# Patient Record
Sex: MALE | Race: WHITE | NOT HISPANIC OR LATINO | URBAN - METROPOLITAN AREA
[De-identification: names, ages, dates, MRNs, and addresses within clinical notes are randomized per-mention and may not be internally consistent; named-entity substitution may affect disease eponyms.]

---

## 2017-06-28 ENCOUNTER — OUTPATIENT (OUTPATIENT)
Dept: OUTPATIENT SERVICES | Facility: HOSPITAL | Age: 62
LOS: 1 days | End: 2017-06-28
Payer: COMMERCIAL

## 2017-06-28 ENCOUNTER — APPOINTMENT (OUTPATIENT)
Dept: MRI IMAGING | Facility: IMAGING CENTER | Age: 62
End: 2017-06-28

## 2017-06-28 DIAGNOSIS — Z00.8 ENCOUNTER FOR OTHER GENERAL EXAMINATION: ICD-10-CM

## 2017-06-28 PROCEDURE — 72148 MRI LUMBAR SPINE W/O DYE: CPT

## 2017-06-28 PROCEDURE — 70547 MR ANGIOGRAPHY NECK W/O DYE: CPT

## 2017-06-28 PROCEDURE — 70551 MRI BRAIN STEM W/O DYE: CPT

## 2017-06-28 PROCEDURE — 70544 MR ANGIOGRAPHY HEAD W/O DYE: CPT

## 2017-07-03 ENCOUNTER — APPOINTMENT (OUTPATIENT)
Dept: VASCULAR SURGERY | Facility: CLINIC | Age: 62
End: 2017-07-03

## 2017-07-03 VITALS
TEMPERATURE: 98 F | HEART RATE: 87 BPM | WEIGHT: 162 LBS | DIASTOLIC BLOOD PRESSURE: 86 MMHG | BODY MASS INDEX: 26.03 KG/M2 | HEIGHT: 66 IN | SYSTOLIC BLOOD PRESSURE: 130 MMHG

## 2017-07-03 DIAGNOSIS — Z86.73 PERSONAL HISTORY OF TRANSIENT ISCHEMIC ATTACK (TIA), AND CEREBRAL INFARCTION W/OUT RESIDUAL DEFICITS: ICD-10-CM

## 2017-07-05 ENCOUNTER — TRANSCRIPTION ENCOUNTER (OUTPATIENT)
Age: 62
End: 2017-07-05

## 2017-07-18 ENCOUNTER — FORM ENCOUNTER (OUTPATIENT)
Age: 62
End: 2017-07-18

## 2017-07-19 ENCOUNTER — OUTPATIENT (OUTPATIENT)
Dept: OUTPATIENT SERVICES | Facility: HOSPITAL | Age: 62
LOS: 1 days | End: 2017-07-19
Payer: COMMERCIAL

## 2017-07-19 ENCOUNTER — APPOINTMENT (OUTPATIENT)
Dept: MRI IMAGING | Facility: CLINIC | Age: 62
End: 2017-07-19

## 2017-07-19 DIAGNOSIS — Z00.8 ENCOUNTER FOR OTHER GENERAL EXAMINATION: ICD-10-CM

## 2017-07-19 PROCEDURE — C8911: CPT

## 2017-07-19 PROCEDURE — A9585: CPT

## 2017-07-26 ENCOUNTER — APPOINTMENT (OUTPATIENT)
Dept: MRI IMAGING | Facility: IMAGING CENTER | Age: 62
End: 2017-07-26

## 2017-07-26 ENCOUNTER — OUTPATIENT (OUTPATIENT)
Dept: OUTPATIENT SERVICES | Facility: HOSPITAL | Age: 62
LOS: 1 days | End: 2017-07-26
Payer: COMMERCIAL

## 2017-07-26 DIAGNOSIS — Z00.8 ENCOUNTER FOR OTHER GENERAL EXAMINATION: ICD-10-CM

## 2017-07-26 PROCEDURE — 72141 MRI NECK SPINE W/O DYE: CPT

## 2017-07-26 PROCEDURE — 72146 MRI CHEST SPINE W/O DYE: CPT

## 2017-07-31 ENCOUNTER — OUTPATIENT (OUTPATIENT)
Dept: OUTPATIENT SERVICES | Facility: HOSPITAL | Age: 62
LOS: 1 days | End: 2017-07-31
Payer: COMMERCIAL

## 2017-07-31 ENCOUNTER — APPOINTMENT (OUTPATIENT)
Dept: CT IMAGING | Facility: CLINIC | Age: 62
End: 2017-07-31
Payer: COMMERCIAL

## 2017-07-31 DIAGNOSIS — Z00.8 ENCOUNTER FOR OTHER GENERAL EXAMINATION: ICD-10-CM

## 2017-07-31 PROCEDURE — 72131 CT LUMBAR SPINE W/O DYE: CPT

## 2017-07-31 PROCEDURE — 72131 CT LUMBAR SPINE W/O DYE: CPT | Mod: 26

## 2017-08-31 ENCOUNTER — OUTPATIENT (OUTPATIENT)
Dept: OUTPATIENT SERVICES | Facility: HOSPITAL | Age: 62
LOS: 1 days | End: 2017-08-31
Payer: COMMERCIAL

## 2017-08-31 ENCOUNTER — APPOINTMENT (OUTPATIENT)
Dept: MRI IMAGING | Facility: IMAGING CENTER | Age: 62
End: 2017-08-31
Payer: COMMERCIAL

## 2017-08-31 DIAGNOSIS — Z00.8 ENCOUNTER FOR OTHER GENERAL EXAMINATION: ICD-10-CM

## 2017-08-31 PROCEDURE — 72195 MRI PELVIS W/O DYE: CPT | Mod: 26

## 2017-08-31 PROCEDURE — 73721 MRI JNT OF LWR EXTRE W/O DYE: CPT | Mod: 26,RT

## 2017-08-31 PROCEDURE — 72195 MRI PELVIS W/O DYE: CPT

## 2017-08-31 PROCEDURE — 73721 MRI JNT OF LWR EXTRE W/O DYE: CPT

## 2017-09-01 ENCOUNTER — APPOINTMENT (OUTPATIENT)
Dept: NUCLEAR MEDICINE | Facility: IMAGING CENTER | Age: 62
End: 2017-09-01

## 2017-09-08 ENCOUNTER — APPOINTMENT (OUTPATIENT)
Dept: NUCLEAR MEDICINE | Facility: IMAGING CENTER | Age: 62
End: 2017-09-08
Payer: COMMERCIAL

## 2017-09-08 ENCOUNTER — OUTPATIENT (OUTPATIENT)
Dept: OUTPATIENT SERVICES | Facility: HOSPITAL | Age: 62
LOS: 1 days | End: 2017-09-08
Payer: COMMERCIAL

## 2017-09-08 DIAGNOSIS — Z00.8 ENCOUNTER FOR OTHER GENERAL EXAMINATION: ICD-10-CM

## 2017-09-08 PROCEDURE — 78315 BONE IMAGING 3 PHASE: CPT

## 2017-09-08 PROCEDURE — 78320: CPT | Mod: 26

## 2017-09-08 PROCEDURE — 78999 UNLISTED MISC PX DX NUC MED: CPT

## 2017-09-08 PROCEDURE — A9561: CPT

## 2018-05-08 ENCOUNTER — OUTPATIENT (OUTPATIENT)
Dept: OUTPATIENT SERVICES | Facility: HOSPITAL | Age: 63
LOS: 1 days | End: 2018-05-08
Payer: COMMERCIAL

## 2018-05-08 ENCOUNTER — APPOINTMENT (OUTPATIENT)
Dept: MRI IMAGING | Facility: IMAGING CENTER | Age: 63
End: 2018-05-08
Payer: COMMERCIAL

## 2018-05-08 DIAGNOSIS — Z00.8 ENCOUNTER FOR OTHER GENERAL EXAMINATION: ICD-10-CM

## 2018-05-08 PROCEDURE — 70551 MRI BRAIN STEM W/O DYE: CPT | Mod: 26

## 2018-05-08 PROCEDURE — 70551 MRI BRAIN STEM W/O DYE: CPT

## 2019-05-13 ENCOUNTER — OUTPATIENT (OUTPATIENT)
Dept: OUTPATIENT SERVICES | Facility: HOSPITAL | Age: 64
LOS: 1 days | End: 2019-05-13
Payer: MEDICARE

## 2019-05-13 ENCOUNTER — APPOINTMENT (OUTPATIENT)
Dept: MRI IMAGING | Facility: IMAGING CENTER | Age: 64
End: 2019-05-13
Payer: MEDICARE

## 2019-05-13 DIAGNOSIS — Z00.8 ENCOUNTER FOR OTHER GENERAL EXAMINATION: ICD-10-CM

## 2019-05-13 PROCEDURE — 70551 MRI BRAIN STEM W/O DYE: CPT | Mod: 26

## 2019-05-13 PROCEDURE — 70551 MRI BRAIN STEM W/O DYE: CPT

## 2020-04-29 ENCOUNTER — OUTPATIENT (OUTPATIENT)
Dept: OUTPATIENT SERVICES | Facility: HOSPITAL | Age: 65
LOS: 1 days | End: 2020-04-29
Payer: MEDICARE

## 2020-04-29 ENCOUNTER — APPOINTMENT (OUTPATIENT)
Dept: MRI IMAGING | Facility: IMAGING CENTER | Age: 65
End: 2020-04-29
Payer: MEDICARE

## 2020-04-29 DIAGNOSIS — Z00.8 ENCOUNTER FOR OTHER GENERAL EXAMINATION: ICD-10-CM

## 2020-04-29 PROCEDURE — 70551 MRI BRAIN STEM W/O DYE: CPT | Mod: 26

## 2020-04-29 PROCEDURE — 70551 MRI BRAIN STEM W/O DYE: CPT

## 2020-08-06 ENCOUNTER — OUTPATIENT (OUTPATIENT)
Dept: OUTPATIENT SERVICES | Facility: HOSPITAL | Age: 65
LOS: 1 days | End: 2020-08-06
Payer: MEDICARE

## 2020-08-06 ENCOUNTER — APPOINTMENT (OUTPATIENT)
Dept: MRI IMAGING | Facility: CLINIC | Age: 65
End: 2020-08-06

## 2020-08-06 DIAGNOSIS — Z00.8 ENCOUNTER FOR OTHER GENERAL EXAMINATION: ICD-10-CM

## 2020-08-06 PROCEDURE — 70551 MRI BRAIN STEM W/O DYE: CPT | Mod: 26

## 2020-08-06 PROCEDURE — 70551 MRI BRAIN STEM W/O DYE: CPT

## 2020-11-20 ENCOUNTER — EMERGENCY (EMERGENCY)
Facility: HOSPITAL | Age: 65
LOS: 1 days | Discharge: HOME | End: 2020-11-20
Attending: EMERGENCY MEDICINE
Payer: MEDICARE

## 2020-11-20 VITALS
RESPIRATION RATE: 17 BRPM | SYSTOLIC BLOOD PRESSURE: 133 MMHG | TEMPERATURE: 98 F | HEART RATE: 72 BPM | DIASTOLIC BLOOD PRESSURE: 75 MMHG | OXYGEN SATURATION: 100 %

## 2020-11-20 VITALS
TEMPERATURE: 98 F | WEIGHT: 158.95 LBS | RESPIRATION RATE: 18 BRPM | OXYGEN SATURATION: 100 % | DIASTOLIC BLOOD PRESSURE: 88 MMHG | HEART RATE: 81 BPM | HEIGHT: 66 IN | SYSTOLIC BLOOD PRESSURE: 148 MMHG

## 2020-11-20 LAB
ALBUMIN SERPL ELPH-MCNC: 4.7 G/DL — SIGNIFICANT CHANGE UP (ref 3.3–5)
ALP SERPL-CCNC: 49 U/L — SIGNIFICANT CHANGE UP (ref 40–120)
ALT FLD-CCNC: 22 U/L — SIGNIFICANT CHANGE UP (ref 10–45)
ANION GAP SERPL CALC-SCNC: 13 MMOL/L — SIGNIFICANT CHANGE UP (ref 5–17)
APTT BLD: 57.7 SEC — HIGH (ref 27.5–35.5)
AST SERPL-CCNC: 28 U/L — SIGNIFICANT CHANGE UP (ref 10–40)
BASOPHILS # BLD AUTO: 0.08 K/UL — SIGNIFICANT CHANGE UP (ref 0–0.2)
BASOPHILS NFR BLD AUTO: 1 % — SIGNIFICANT CHANGE UP (ref 0–2)
BILIRUB SERPL-MCNC: 0.5 MG/DL — SIGNIFICANT CHANGE UP (ref 0.2–1.2)
BUN SERPL-MCNC: 13 MG/DL — SIGNIFICANT CHANGE UP (ref 7–23)
CALCIUM SERPL-MCNC: 9.8 MG/DL — SIGNIFICANT CHANGE UP (ref 8.4–10.5)
CHLORIDE SERPL-SCNC: 102 MMOL/L — SIGNIFICANT CHANGE UP (ref 96–108)
CO2 SERPL-SCNC: 24 MMOL/L — SIGNIFICANT CHANGE UP (ref 22–31)
CREAT SERPL-MCNC: 1.29 MG/DL — SIGNIFICANT CHANGE UP (ref 0.5–1.3)
EOSINOPHIL # BLD AUTO: 0.21 K/UL — SIGNIFICANT CHANGE UP (ref 0–0.5)
EOSINOPHIL NFR BLD AUTO: 2.6 % — SIGNIFICANT CHANGE UP (ref 0–6)
GLUCOSE SERPL-MCNC: 92 MG/DL — SIGNIFICANT CHANGE UP (ref 70–99)
HCT VFR BLD CALC: 43 % — SIGNIFICANT CHANGE UP (ref 39–50)
HGB BLD-MCNC: 14.6 G/DL — SIGNIFICANT CHANGE UP (ref 13–17)
IMM GRANULOCYTES NFR BLD AUTO: 0.2 % — SIGNIFICANT CHANGE UP (ref 0–1.5)
INR BLD: 3.59 RATIO — HIGH (ref 0.88–1.16)
LYMPHOCYTES # BLD AUTO: 2.5 K/UL — SIGNIFICANT CHANGE UP (ref 1–3.3)
LYMPHOCYTES # BLD AUTO: 31 % — SIGNIFICANT CHANGE UP (ref 13–44)
MCHC RBC-ENTMCNC: 31.3 PG — SIGNIFICANT CHANGE UP (ref 27–34)
MCHC RBC-ENTMCNC: 34 GM/DL — SIGNIFICANT CHANGE UP (ref 32–36)
MCV RBC AUTO: 92.3 FL — SIGNIFICANT CHANGE UP (ref 80–100)
MONOCYTES # BLD AUTO: 0.66 K/UL — SIGNIFICANT CHANGE UP (ref 0–0.9)
MONOCYTES NFR BLD AUTO: 8.2 % — SIGNIFICANT CHANGE UP (ref 2–14)
NEUTROPHILS # BLD AUTO: 4.59 K/UL — SIGNIFICANT CHANGE UP (ref 1.8–7.4)
NEUTROPHILS NFR BLD AUTO: 57 % — SIGNIFICANT CHANGE UP (ref 43–77)
NRBC # BLD: 0 /100 WBCS — SIGNIFICANT CHANGE UP (ref 0–0)
OB PNL STL: NEGATIVE — SIGNIFICANT CHANGE UP
PLATELET # BLD AUTO: 310 K/UL — SIGNIFICANT CHANGE UP (ref 150–400)
POTASSIUM SERPL-MCNC: 4.3 MMOL/L — SIGNIFICANT CHANGE UP (ref 3.5–5.3)
POTASSIUM SERPL-SCNC: 4.3 MMOL/L — SIGNIFICANT CHANGE UP (ref 3.5–5.3)
PROT SERPL-MCNC: 7.4 G/DL — SIGNIFICANT CHANGE UP (ref 6–8.3)
PROTHROM AB SERPL-ACNC: 40.5 SEC — HIGH (ref 10.6–13.6)
RBC # BLD: 4.66 M/UL — SIGNIFICANT CHANGE UP (ref 4.2–5.8)
RBC # FLD: 13 % — SIGNIFICANT CHANGE UP (ref 10.3–14.5)
SODIUM SERPL-SCNC: 139 MMOL/L — SIGNIFICANT CHANGE UP (ref 135–145)
WBC # BLD: 8.06 K/UL — SIGNIFICANT CHANGE UP (ref 3.8–10.5)
WBC # FLD AUTO: 8.06 K/UL — SIGNIFICANT CHANGE UP (ref 3.8–10.5)

## 2020-11-20 PROCEDURE — 99283 EMERGENCY DEPT VISIT LOW MDM: CPT

## 2020-11-20 PROCEDURE — 85610 PROTHROMBIN TIME: CPT

## 2020-11-20 PROCEDURE — 80053 COMPREHEN METABOLIC PANEL: CPT

## 2020-11-20 PROCEDURE — 85730 THROMBOPLASTIN TIME PARTIAL: CPT

## 2020-11-20 PROCEDURE — 99284 EMERGENCY DEPT VISIT MOD MDM: CPT | Mod: GC

## 2020-11-20 PROCEDURE — 85025 COMPLETE CBC W/AUTO DIFF WBC: CPT

## 2020-11-20 PROCEDURE — 82272 OCCULT BLD FECES 1-3 TESTS: CPT

## 2020-11-20 NOTE — ED ADULT NURSE NOTE - OBJECTIVE STATEMENT
64 y/o male coming to the Er with c/o black stools x2 days. PMH multiple TIA, DM2, HTN, hiatal hernia, antiphonolipid syndrome, asthma, osteoporosis, angioedema (april 2020). Patient reports having multiple black stools the past 2 days. 64 y/o male coming to the Er with c/o black stools x2 days. PMH multiple TIA, DM2, HTN, hiatal hernia, antiphonolipid syndrome, asthma, osteoporosis, angioedema (april 2020). Patient reports having multiple formed black stools the past 2 days. Lung sounds clear b/l. capillary refill less than 2 seconds. Abdomen is soft, non distended, non tender. Patient denies chest pain, SOB, fever, chills, n/v/d, abdominal pain, urinary symptoms, blood in the urine, denies taking Pepto-Bismol. NINO JORGENSEN at the bedside. Safety measures maintained. Will continue to monitor. 64 y/o male coming to the Er with c/o black stools x2 days. PMH multiple TIA, DM2, HTN, hiatal hernia, antiphonolipid syndrome, asthma, osteoporosis, angioedema (april 2020). Patient reports having multiple formed black stools the past 2 days. Patient reports taking 2mg of Coumadin daily. Lung sounds clear b/l. capillary refill less than 2 seconds. Abdomen is soft, non distended, non tender. Patient denies chest pain, SOB, fever, chills, n/v/d, abdominal pain, urinary symptoms, blood in the urine, denies taking Pepto-Bismol. DERRICK. MD at the bedside. Safety measures maintained. Will continue to monitor. 66 y/o male coming to the Er with c/o black stools x2 days. PMH multiple TIA, DM2, hemorrhoids, HTN, hiatal hernia, antiphonolipid syndrome, asthma, osteoporosis, angioedema (april 2020). Patient reports having multiple formed black stools the past 2 days. Patient reports taking 2mg of Coumadin daily. Lung sounds clear b/l. capillary refill less than 2 seconds. Abdomen is soft, non distended, non tender. Patient denies chest pain, SOB, fever, chills, n/v/d, abdominal pain, urinary symptoms, blood in the urine, denies taking Pepto-Bismol. NINO JORGENSEN at the bedside. Safety measures maintained. Will continue to monitor.

## 2020-11-20 NOTE — ED PROVIDER NOTE - PMH
Antiphospholipid Syndrome    Asthma    Cataract    Diabetes Mellitus Type I    Dysphagia    Hiatal Hernia    History of Transient Ischemic Attack    HLD (Hyperlipidemia)    Hyperlipidemia    Hypothyroidism    Raynaud Disease    TIA (Transient Ischemic Attack)    Vitamin D Deficiency

## 2020-11-20 NOTE — ED PROVIDER NOTE - OBJECTIVE STATEMENT
attn - pt seen in OR2 - pt reports black stool x 2 days without pain or other symptoms.  NO: dyspepsia, abdo pain, lightheadedness, cp, sob, guillory, other bleeding/bruising.  Pt had colonoscopy in Aug for polyps every 3 yrs.  pt took Pepcid until Sept.  pt has hx of antiphospholipid with TIA and takes warfarin daily -  no change in dose the last 4 yrs.  Pt emailed his GI Dr. Amador Metz and advised to go to ER.  PMD - Lj Christensen. attn - pt seen in OR2 - pt reports black stool x 2 days without pain or other symptoms.  NO: dyspepsia, abdo pain, lightheadedness, cp, sob, guillory, other bleeding/bruising.  Pt had colonoscopy in Aug for polyps every 3 yrs.  pt took Pepcid until Sept.  pt has hx of antiphospholipid with TIA and takes warfarin daily -  no change in dose the last 4 yrs.  Pt emailed his GI Dr. Amador Metz and advised to go to ER.  PMD - Lj Christensen.    Neha PGY2 - pt w/ hx colorblindness (reported nurse's stool same color as nurse's hair which was brown), hx TIA on coumadin, hx antiphospholipid antibody, recent labwork in october indicating H/H 14/40, reports asymptomatic dark stools over several days. Colonoscopy in august unremarkable. No weakness CP, HA, SOB, hematochezia, f/c/n/v/d.

## 2020-11-20 NOTE — ED PROVIDER NOTE - PROGRESS NOTE DETAILS
jeremi: occult neg, H/H comparable to prior month w/ reported dark stools x48 hours. Will f/u remaining labs and likely d/c with return precautions, pt to f/u with GI. jeremi: inr slightly elevated to 3.6, requsted to hold for one day, otherwise BMP nl. will dc. precautions given. Resident: Ivan Solomon – Pt was re-evaluated at bedside, VSS, feeling better overall. Results were discussed with patient as well as return precautions and follow up plan with PCP and/or specialist. Time was taken to answer any questions that the patient had before providing them with discharge paperwork.

## 2020-11-20 NOTE — ED PROVIDER NOTE - PATIENT PORTAL LINK FT
You can access the FollowMyHealth Patient Portal offered by [facility] by registering at the following website: [email or web address]. By joining [employer]’s FollowMyHealth portal, you will also be able to view your health information using other applications (apps) compatible with our system.

## 2020-11-20 NOTE — ED PROVIDER NOTE - NSFOLLOWUPINSTRUCTIONS_ED_ALL_ED_FT
Please follow up with your primary care provider for further concerns you may have regarding your general health. Attached you will find your results from today's visit. Continue taking your medications as prescribed and keep your upcoming medical appointments.    Please return if you develop grossly bloody stools or if you began developing weakness, shortness of breath, chest pain, or began vomiting or coughing blood. Follow up with your gastrointestinal doctor as they requested.     Your results from today's visit are requested.     Continue taking your medications as indicated. Hold your coumadin 1 day as we discussed as your INR was slightly high.

## 2020-11-20 NOTE — ED PROVIDER NOTE - PHYSICAL EXAMINATION
Attn - alert, nad, no pallor, moist mm, lungs-, cor-, abdo - soft, NT, ND, no CVAT, rectal per resident - dark brown stool - guiac sent to lab., extrem-, neuro-, skin - no petechia or ecchymosis.

## 2021-04-28 ENCOUNTER — APPOINTMENT (OUTPATIENT)
Dept: MRI IMAGING | Facility: CLINIC | Age: 66
End: 2021-04-28
Payer: MEDICARE

## 2021-04-28 ENCOUNTER — OUTPATIENT (OUTPATIENT)
Dept: OUTPATIENT SERVICES | Facility: HOSPITAL | Age: 66
LOS: 1 days | End: 2021-04-28
Payer: MEDICARE

## 2021-04-28 DIAGNOSIS — I67.89 OTHER CEREBROVASCULAR DISEASE: ICD-10-CM

## 2021-04-28 PROCEDURE — 70551 MRI BRAIN STEM W/O DYE: CPT | Mod: 26,MH

## 2021-04-28 PROCEDURE — 70551 MRI BRAIN STEM W/O DYE: CPT

## 2021-09-22 NOTE — ED ADULT NURSE NOTE - MODE OF DISCHARGE
General--no acute distress Eyes--anicteric HENT--normocephalic, atraumatic head Neck--no lymphadenopathy Chest--normal breath sounds Heart--regular rate and rhythm Abdomen--soft, non tender, non distended, bowel sounds present Musculoskeletal--normal gait and station Skin--no rashes Neurologic--Alert and oriented x 3 Psychiatric--stable mood, appropriate affect
Ambulatory

## 2022-04-12 ENCOUNTER — APPOINTMENT (OUTPATIENT)
Dept: MRI IMAGING | Facility: CLINIC | Age: 67
End: 2022-04-12
Payer: MEDICARE

## 2022-04-12 ENCOUNTER — OUTPATIENT (OUTPATIENT)
Dept: OUTPATIENT SERVICES | Facility: HOSPITAL | Age: 67
LOS: 1 days | End: 2022-04-12
Payer: MEDICARE

## 2022-04-12 DIAGNOSIS — Z00.8 ENCOUNTER FOR OTHER GENERAL EXAMINATION: ICD-10-CM

## 2022-04-12 PROCEDURE — 70551 MRI BRAIN STEM W/O DYE: CPT | Mod: 26,MH

## 2022-04-12 PROCEDURE — 70551 MRI BRAIN STEM W/O DYE: CPT | Mod: MH

## 2022-09-19 ENCOUNTER — OUTPATIENT (OUTPATIENT)
Dept: OUTPATIENT SERVICES | Facility: HOSPITAL | Age: 67
LOS: 1 days | End: 2022-09-19
Payer: MEDICARE

## 2022-09-19 ENCOUNTER — APPOINTMENT (OUTPATIENT)
Dept: MRI IMAGING | Facility: CLINIC | Age: 67
End: 2022-09-19

## 2022-09-19 DIAGNOSIS — Z00.00 ENCOUNTER FOR GENERAL ADULT MEDICAL EXAMINATION WITHOUT ABNORMAL FINDINGS: ICD-10-CM

## 2022-09-19 DIAGNOSIS — R42 DIZZINESS AND GIDDINESS: ICD-10-CM

## 2022-09-19 DIAGNOSIS — R20.0 ANESTHESIA OF SKIN: ICD-10-CM

## 2022-09-19 PROCEDURE — 72141 MRI NECK SPINE W/O DYE: CPT | Mod: MH

## 2022-09-19 PROCEDURE — 72141 MRI NECK SPINE W/O DYE: CPT | Mod: 26,MH

## 2022-09-19 PROCEDURE — 70553 MRI BRAIN STEM W/O & W/DYE: CPT | Mod: MH

## 2022-09-19 PROCEDURE — 70553 MRI BRAIN STEM W/O & W/DYE: CPT | Mod: 26,MH

## 2022-09-19 PROCEDURE — 70544 MR ANGIOGRAPHY HEAD W/O DYE: CPT | Mod: MH

## 2022-09-19 PROCEDURE — 70544 MR ANGIOGRAPHY HEAD W/O DYE: CPT | Mod: 26,MH,59

## 2022-09-19 PROCEDURE — A9585: CPT

## 2022-09-29 ENCOUNTER — APPOINTMENT (OUTPATIENT)
Dept: MRI IMAGING | Facility: CLINIC | Age: 67
End: 2022-09-29

## 2022-09-29 PROCEDURE — 70547 MR ANGIOGRAPHY NECK W/O DYE: CPT

## 2022-09-29 PROCEDURE — 73218 MRI UPPER EXTREMITY W/O DYE: CPT | Mod: LT,MH

## 2022-09-29 PROCEDURE — 73221 MRI JOINT UPR EXTREM W/O DYE: CPT | Mod: LT,MH

## 2022-09-29 PROCEDURE — 70544 MR ANGIOGRAPHY HEAD W/O DYE: CPT | Mod: MH

## 2023-04-27 ENCOUNTER — OUTPATIENT (OUTPATIENT)
Dept: OUTPATIENT SERVICES | Facility: HOSPITAL | Age: 68
LOS: 1 days | End: 2023-04-27
Payer: MEDICARE

## 2023-04-27 ENCOUNTER — APPOINTMENT (OUTPATIENT)
Dept: MRI IMAGING | Facility: CLINIC | Age: 68
End: 2023-04-27
Payer: MEDICARE

## 2023-04-27 DIAGNOSIS — G45.9 TRANSIENT CEREBRAL ISCHEMIC ATTACK, UNSPECIFIED: ICD-10-CM

## 2023-04-27 PROCEDURE — 70551 MRI BRAIN STEM W/O DYE: CPT | Mod: 26,MH

## 2023-04-27 PROCEDURE — 70551 MRI BRAIN STEM W/O DYE: CPT | Mod: MH

## 2024-04-22 ENCOUNTER — OUTPATIENT (OUTPATIENT)
Dept: OUTPATIENT SERVICES | Facility: HOSPITAL | Age: 69
LOS: 1 days | End: 2024-04-22
Payer: MEDICARE

## 2024-04-22 ENCOUNTER — APPOINTMENT (OUTPATIENT)
Dept: MRI IMAGING | Facility: CLINIC | Age: 69
End: 2024-04-22
Payer: MEDICARE

## 2024-04-22 DIAGNOSIS — Z00.8 ENCOUNTER FOR OTHER GENERAL EXAMINATION: ICD-10-CM

## 2024-04-22 PROCEDURE — 70551 MRI BRAIN STEM W/O DYE: CPT | Mod: 26,MH

## 2024-04-22 PROCEDURE — 70551 MRI BRAIN STEM W/O DYE: CPT | Mod: MH

## 2024-05-23 ENCOUNTER — APPOINTMENT (OUTPATIENT)
Dept: MRI IMAGING | Facility: CLINIC | Age: 69
End: 2024-05-23
Payer: MEDICARE

## 2024-05-23 ENCOUNTER — OUTPATIENT (OUTPATIENT)
Dept: OUTPATIENT SERVICES | Facility: HOSPITAL | Age: 69
LOS: 1 days | End: 2024-05-23
Payer: MEDICARE

## 2024-05-23 DIAGNOSIS — Z00.8 ENCOUNTER FOR OTHER GENERAL EXAMINATION: ICD-10-CM

## 2024-05-23 PROCEDURE — 72148 MRI LUMBAR SPINE W/O DYE: CPT | Mod: 26,MH

## 2024-05-23 PROCEDURE — 72148 MRI LUMBAR SPINE W/O DYE: CPT | Mod: MH

## 2024-07-02 ENCOUNTER — OUTPATIENT (OUTPATIENT)
Dept: OUTPATIENT SERVICES | Facility: HOSPITAL | Age: 69
LOS: 1 days | End: 2024-07-02
Payer: MEDICARE

## 2024-07-02 ENCOUNTER — APPOINTMENT (OUTPATIENT)
Dept: MRI IMAGING | Facility: CLINIC | Age: 69
End: 2024-07-02
Payer: MEDICARE

## 2024-07-02 DIAGNOSIS — Z00.8 ENCOUNTER FOR OTHER GENERAL EXAMINATION: ICD-10-CM

## 2024-07-02 PROCEDURE — 70551 MRI BRAIN STEM W/O DYE: CPT | Mod: MH

## 2024-07-02 PROCEDURE — 70551 MRI BRAIN STEM W/O DYE: CPT | Mod: 26,MH

## 2024-09-16 ENCOUNTER — DOCTOR'S OFFICE (OUTPATIENT)
Facility: LOCATION | Age: 69
Setting detail: OPHTHALMOLOGY
End: 2024-09-16
Payer: MEDICARE

## 2024-09-16 DIAGNOSIS — H35.373: ICD-10-CM

## 2024-09-16 DIAGNOSIS — E11.3292: ICD-10-CM

## 2024-09-16 DIAGNOSIS — H52.12: ICD-10-CM

## 2024-09-16 DIAGNOSIS — E11.3291: ICD-10-CM

## 2024-09-16 DIAGNOSIS — H52.11: ICD-10-CM

## 2024-09-16 DIAGNOSIS — H43.811: ICD-10-CM

## 2024-09-16 PROCEDURE — 92235 FLUORESCEIN ANGRPH MLTIFRAME: CPT | Performed by: OPHTHALMOLOGY

## 2024-09-16 PROCEDURE — 92201 OPSCPY EXTND RTA DRAW UNI/BI: CPT | Performed by: OPHTHALMOLOGY

## 2024-09-16 PROCEDURE — 99214 OFFICE O/P EST MOD 30 MIN: CPT | Performed by: OPHTHALMOLOGY

## 2024-09-16 PROCEDURE — 92134 CPTRZ OPH DX IMG PST SGM RTA: CPT | Performed by: OPHTHALMOLOGY

## 2024-09-18 ENCOUNTER — OFFICE (OUTPATIENT)
Facility: LOCATION | Age: 69
Setting detail: OPHTHALMOLOGY
End: 2024-09-18
Payer: MEDICARE

## 2024-09-18 DIAGNOSIS — H52.11: ICD-10-CM

## 2024-09-18 DIAGNOSIS — H53.19: ICD-10-CM

## 2024-09-18 DIAGNOSIS — H25.13: ICD-10-CM

## 2024-09-18 DIAGNOSIS — H43.811: ICD-10-CM

## 2024-09-18 DIAGNOSIS — H53.40: ICD-10-CM

## 2024-09-18 DIAGNOSIS — H52.12: ICD-10-CM

## 2024-09-18 DIAGNOSIS — H35.033: ICD-10-CM

## 2024-09-18 PROCEDURE — 92083 EXTENDED VISUAL FIELD XM: CPT | Performed by: OPHTHALMOLOGY

## 2024-09-18 PROCEDURE — 92012 INTRM OPH EXAM EST PATIENT: CPT | Performed by: OPHTHALMOLOGY

## 2024-09-18 PROCEDURE — 92133 CPTRZD OPH DX IMG PST SGM ON: CPT | Performed by: OPHTHALMOLOGY

## 2024-09-18 ASSESSMENT — CONFRONTATIONAL VISUAL FIELD TEST (CVF)
OD_FINDINGS: FULL
OS_FINDINGS: FULL

## 2024-09-27 PROBLEM — H53.19 SUBJECTIVE VISUAL DISTURBANCES, OTHER: Status: ACTIVE | Noted: 2024-09-18

## 2024-09-27 PROBLEM — H52.12: Status: ACTIVE | Noted: 2024-09-16

## 2024-09-27 PROBLEM — H43.811 POSTERIOR VITREOUS DETACHMENT; RIGHT EYE: Status: ACTIVE | Noted: 2024-09-16

## 2024-09-27 PROBLEM — H35.033 HYPERTENSIVE RETINOPATHY; BOTH EYES: Status: ACTIVE | Noted: 2024-09-18

## 2024-09-27 PROBLEM — H52.11: Status: ACTIVE | Noted: 2024-09-16

## 2024-09-27 PROBLEM — H25.13 CATARACT SENILE NUCLEAR SCLEROSIS; BOTH EYES: Status: ACTIVE | Noted: 2024-09-18

## 2024-09-27 PROBLEM — H35.373 EPIRETINAL MEMBRANE; BOTH EYES: Status: ACTIVE | Noted: 2024-09-16

## 2024-09-27 PROBLEM — H53.40 VISUAL FIELD DEFECT,UNSPECIFIED: Status: ACTIVE | Noted: 2024-09-18

## 2024-10-18 ENCOUNTER — OFFICE (OUTPATIENT)
Facility: LOCATION | Age: 69
Setting detail: OPHTHALMOLOGY
End: 2024-10-18
Payer: MEDICARE

## 2024-10-18 DIAGNOSIS — H35.033: ICD-10-CM

## 2024-10-18 DIAGNOSIS — H25.13: ICD-10-CM

## 2024-10-18 DIAGNOSIS — H52.12: ICD-10-CM

## 2024-10-18 DIAGNOSIS — H53.19: ICD-10-CM

## 2024-10-18 DIAGNOSIS — Z79.84: ICD-10-CM

## 2024-10-18 DIAGNOSIS — H43.811: ICD-10-CM

## 2024-10-18 DIAGNOSIS — H53.40: ICD-10-CM

## 2024-10-18 PROCEDURE — 92012 INTRM OPH EXAM EST PATIENT: CPT | Performed by: OPHTHALMOLOGY

## 2024-10-18 ASSESSMENT — KERATOMETRY
OS_K1POWER_DIOPTERS: 42.75
OS_K2POWER_DIOPTERS: 43.75
OS_AXISANGLE_DEGREES: 010
OD_K1POWER_DIOPTERS: 42.50
OD_K2POWER_DIOPTERS: 43.00
OD_AXISANGLE_DEGREES: 003

## 2024-10-18 ASSESSMENT — REFRACTION_CURRENTRX
OS_AXIS: 085
OS_OVR_VA: 20/
OD_SPHERE: -3.00
OS_SPHERE: -2.50
OD_ADD: +2.75
OD_AXIS: 093
OD_CYLINDER: -1.00
OS_CYLINDER: -0.75
OS_ADD: +2.75
OD_OVR_VA: 20/

## 2024-10-18 ASSESSMENT — REFRACTION_AUTOREFRACTION
OS_CYLINDER: -0.50
OS_AXIS: 073
OD_SPHERE: -3.00
OS_SPHERE: -2.50
OD_CYLINDER: -0.25
OS_CYLINDER: -0.75
OD_SPHERE: -3.00
OS_AXIS: 041
OD_AXIS: 080
OS_SPHERE: -2.00
OD_CYLINDER: -0.25
OD_AXIS: 095

## 2024-10-18 ASSESSMENT — VISUAL ACUITY
OD_BCVA: 20/30
OS_BCVA: 20/40

## 2024-10-18 ASSESSMENT — CONFRONTATIONAL VISUAL FIELD TEST (CVF)
OS_FINDINGS: FULL
OD_FINDINGS: FULL

## 2024-10-21 ENCOUNTER — DOCTOR'S OFFICE (OUTPATIENT)
Facility: LOCATION | Age: 69
Setting detail: OPHTHALMOLOGY
End: 2024-10-21
Payer: MEDICARE

## 2024-10-21 DIAGNOSIS — H52.11: ICD-10-CM

## 2024-10-21 DIAGNOSIS — H43.391: ICD-10-CM

## 2024-10-21 DIAGNOSIS — E11.3292: ICD-10-CM

## 2024-10-21 DIAGNOSIS — H35.373: ICD-10-CM

## 2024-10-21 DIAGNOSIS — H43.811: ICD-10-CM

## 2024-10-21 DIAGNOSIS — H52.12: ICD-10-CM

## 2024-10-21 DIAGNOSIS — E11.3291: ICD-10-CM

## 2024-10-21 PROCEDURE — 92134 CPTRZ OPH DX IMG PST SGM RTA: CPT | Performed by: OPHTHALMOLOGY

## 2024-10-21 PROCEDURE — 99214 OFFICE O/P EST MOD 30 MIN: CPT | Performed by: OPHTHALMOLOGY

## 2024-10-21 PROCEDURE — 92201 OPSCPY EXTND RTA DRAW UNI/BI: CPT | Performed by: OPHTHALMOLOGY

## 2024-10-21 ASSESSMENT — KERATOMETRY
OD_AXISANGLE_DEGREES: 003
OD_K1POWER_DIOPTERS: 42.50
OS_K2POWER_DIOPTERS: 43.75
OS_K1POWER_DIOPTERS: 42.75
OD_K2POWER_DIOPTERS: 43.00
OS_AXISANGLE_DEGREES: 010

## 2024-10-21 ASSESSMENT — REFRACTION_AUTOREFRACTION
OD_SPHERE: -3.00
OS_SPHERE: -2.50
OS_AXIS: 073
OD_CYLINDER: -0.25
OD_AXIS: 095
OD_CYLINDER: -0.25
OS_AXIS: 041
OS_CYLINDER: -0.75
OD_SPHERE: -3.00
OS_CYLINDER: -0.50
OS_SPHERE: -2.00
OD_AXIS: 080

## 2024-10-21 ASSESSMENT — VISUAL ACUITY
OS_BCVA: 20/40
OD_BCVA: 20/50

## 2024-11-15 ENCOUNTER — APPOINTMENT (OUTPATIENT)
Dept: OPHTHALMOLOGY | Facility: CLINIC | Age: 69
End: 2024-11-15
Payer: MEDICARE

## 2024-11-15 ENCOUNTER — NON-APPOINTMENT (OUTPATIENT)
Age: 69
End: 2024-11-15

## 2024-11-15 PROCEDURE — 92083 EXTENDED VISUAL FIELD XM: CPT

## 2024-11-15 PROCEDURE — 92004 COMPRE OPH EXAM NEW PT 1/>: CPT

## 2024-11-15 PROCEDURE — 92134 CPTRZ OPH DX IMG PST SGM RTA: CPT

## 2024-11-22 ENCOUNTER — TRANSCRIPTION ENCOUNTER (OUTPATIENT)
Age: 69
End: 2024-11-22

## 2024-11-25 ENCOUNTER — APPOINTMENT (OUTPATIENT)
Dept: MRI IMAGING | Facility: CLINIC | Age: 69
End: 2024-11-25

## 2024-11-25 PROCEDURE — 70546 MR ANGIOGRAPH HEAD W/O&W/DYE: CPT | Mod: MH

## 2024-11-25 PROCEDURE — 70551 MRI BRAIN STEM W/O DYE: CPT | Mod: MH,XU

## 2024-11-25 PROCEDURE — 70549 MR ANGIOGRAPH NECK W/O&W/DYE: CPT | Mod: MH

## 2024-11-25 PROCEDURE — A9585: CPT

## 2025-01-20 ENCOUNTER — OUTPATIENT (OUTPATIENT)
Dept: OUTPATIENT SERVICES | Facility: HOSPITAL | Age: 70
LOS: 1 days | End: 2025-01-20
Payer: MEDICARE

## 2025-01-20 ENCOUNTER — APPOINTMENT (OUTPATIENT)
Dept: CT IMAGING | Facility: CLINIC | Age: 70
End: 2025-01-20
Payer: MEDICARE

## 2025-01-20 DIAGNOSIS — Q79.63 VASCULAR EHLERS-DANLOS SYNDROME: ICD-10-CM

## 2025-01-20 PROCEDURE — 70498 CT ANGIOGRAPHY NECK: CPT | Mod: 26

## 2025-01-20 PROCEDURE — 70496 CT ANGIOGRAPHY HEAD: CPT | Mod: 26

## 2025-01-20 PROCEDURE — 70496 CT ANGIOGRAPHY HEAD: CPT

## 2025-01-20 PROCEDURE — 70498 CT ANGIOGRAPHY NECK: CPT

## 2025-01-27 ENCOUNTER — APPOINTMENT (OUTPATIENT)
Dept: NEUROLOGY | Facility: CLINIC | Age: 70
End: 2025-01-27
Payer: MEDICARE

## 2025-01-27 VITALS
DIASTOLIC BLOOD PRESSURE: 70 MMHG | BODY MASS INDEX: 24.43 KG/M2 | WEIGHT: 152 LBS | HEIGHT: 66 IN | OXYGEN SATURATION: 96 % | HEART RATE: 71 BPM | SYSTOLIC BLOOD PRESSURE: 114 MMHG

## 2025-01-27 DIAGNOSIS — Q79.63 VASCULAR EHLERS-DANLOS SYNDROME: ICD-10-CM

## 2025-01-27 DIAGNOSIS — Z86.73 PERSONAL HISTORY OF TRANSIENT ISCHEMIC ATTACK (TIA), AND CEREBRAL INFARCTION W/OUT RESIDUAL DEFICITS: ICD-10-CM

## 2025-01-27 PROCEDURE — 99205 OFFICE O/P NEW HI 60 MIN: CPT

## 2025-01-27 RX ORDER — ARMODAFINIL 200 MG/1
200 TABLET ORAL
Refills: 0 | Status: ACTIVE | COMMUNITY

## 2025-01-27 RX ORDER — MONTELUKAST 10 MG/1
10 TABLET, FILM COATED ORAL
Refills: 0 | Status: ACTIVE | COMMUNITY

## 2025-01-27 RX ORDER — LEVOCETIRIZINE DIHYDROCHLORIDE 5 MG/1
5 TABLET ORAL
Refills: 0 | Status: ACTIVE | COMMUNITY

## 2025-01-27 RX ORDER — ALBUTEROL 90 MCG
90 AEROSOL (GRAM) INHALATION
Refills: 0 | Status: ACTIVE | COMMUNITY

## 2025-01-27 RX ORDER — LEVOTHYROXINE SODIUM 137 UG/1
137 TABLET ORAL
Refills: 0 | Status: ACTIVE | COMMUNITY

## 2025-01-27 RX ORDER — FENOFIBRATE 160 MG/1
160 TABLET ORAL
Refills: 0 | Status: ACTIVE | COMMUNITY

## 2025-01-27 RX ORDER — TAMSULOSIN HYDROCHLORIDE 0.4 MG/1
0.4 CAPSULE ORAL
Refills: 0 | Status: ACTIVE | COMMUNITY

## 2025-01-27 RX ORDER — ESCITALOPRAM OXALATE 5 MG/1
5 TABLET ORAL
Refills: 0 | Status: ACTIVE | COMMUNITY

## 2025-01-27 RX ORDER — ROSUVASTATIN CALCIUM 20 MG/1
20 TABLET, FILM COATED ORAL
Refills: 0 | Status: ACTIVE | COMMUNITY

## 2025-01-27 RX ORDER — FOLIC ACID 1 MG/1
1 TABLET ORAL
Refills: 0 | Status: ACTIVE | COMMUNITY

## 2025-01-27 RX ORDER — APIXABAN 5 MG/1
5 TABLET, FILM COATED ORAL
Refills: 0 | Status: ACTIVE | COMMUNITY

## 2025-01-27 RX ORDER — FLUTICASONE PROPIONATE AND SALMETEROL XINAFOATE 115; 21 UG/1; UG/1
115-21 AEROSOL, METERED RESPIRATORY (INHALATION)
Refills: 0 | Status: ACTIVE | COMMUNITY

## 2025-05-06 ENCOUNTER — APPOINTMENT (OUTPATIENT)
Dept: MRI IMAGING | Facility: CLINIC | Age: 70
End: 2025-05-06
Payer: MEDICARE

## 2025-05-06 ENCOUNTER — OUTPATIENT (OUTPATIENT)
Dept: OUTPATIENT SERVICES | Facility: HOSPITAL | Age: 70
LOS: 1 days | End: 2025-05-06
Payer: MEDICARE

## 2025-05-06 DIAGNOSIS — D68.61 ANTIPHOSPHOLIPID SYNDROME: ICD-10-CM

## 2025-05-06 PROCEDURE — 70551 MRI BRAIN STEM W/O DYE: CPT | Mod: MH

## 2025-05-06 PROCEDURE — 70551 MRI BRAIN STEM W/O DYE: CPT | Mod: 26

## 2025-05-09 ENCOUNTER — NON-APPOINTMENT (OUTPATIENT)
Age: 70
End: 2025-05-09

## 2025-05-09 ENCOUNTER — APPOINTMENT (OUTPATIENT)
Dept: OPHTHALMOLOGY | Facility: CLINIC | Age: 70
End: 2025-05-09
Payer: MEDICARE

## 2025-05-09 PROCEDURE — 92083 EXTENDED VISUAL FIELD XM: CPT

## 2025-05-09 PROCEDURE — 92134 CPTRZ OPH DX IMG PST SGM RTA: CPT

## 2025-05-09 PROCEDURE — 92014 COMPRE OPH EXAM EST PT 1/>: CPT
